# Patient Record
Sex: FEMALE | HISPANIC OR LATINO | URBAN - METROPOLITAN AREA
[De-identification: names, ages, dates, MRNs, and addresses within clinical notes are randomized per-mention and may not be internally consistent; named-entity substitution may affect disease eponyms.]

---

## 2024-09-27 ENCOUNTER — OFFICE VISIT (OUTPATIENT)
Dept: OBSTETRICS AND GYNECOLOGY | Facility: CLINIC | Age: 44
End: 2024-09-27
Payer: COMMERCIAL

## 2024-09-27 VITALS
WEIGHT: 161.81 LBS | DIASTOLIC BLOOD PRESSURE: 74 MMHG | HEIGHT: 67 IN | SYSTOLIC BLOOD PRESSURE: 122 MMHG | BODY MASS INDEX: 25.4 KG/M2

## 2024-09-27 DIAGNOSIS — D21.9 FIBROIDS: ICD-10-CM

## 2024-09-27 DIAGNOSIS — N93.8 DUB (DYSFUNCTIONAL UTERINE BLEEDING): Primary | ICD-10-CM

## 2024-09-27 PROCEDURE — 99999 PR PBB SHADOW E&M-NEW PATIENT-LVL III: CPT | Mod: PBBFAC,,, | Performed by: OBSTETRICS & GYNECOLOGY

## 2024-09-27 RX ORDER — NORETHINDRONE ACETATE AND ETHINYL ESTRADIOL 1MG-20(21)
1 KIT ORAL DAILY
Qty: 90 TABLET | Refills: 3 | Status: SHIPPED | OUTPATIENT
Start: 2024-09-27 | End: 2025-09-27

## 2024-09-27 RX ORDER — KETOCONAZOLE 20 MG/ML
SHAMPOO, SUSPENSION TOPICAL
COMMUNITY
Start: 2024-07-17

## 2024-09-27 RX ORDER — CLINDAMYCIN PHOSPHATE 11.9 MG/ML
SOLUTION TOPICAL
COMMUNITY
Start: 2024-01-15

## 2024-09-27 NOTE — PROGRESS NOTES
HISTORY OF PRESENT ILLNESS:    Jennie Davis is a 43 y.o. female, , No LMP recorded.,  presents for irregular bleeding   .   Last pap:  1 year   Contraception: Depo-Provera injections.    Sexually transmitted infection risk: very low risk of STD exposure.   Menstrual flow: irregular.  This is the extent of the patient's complaints at this time.     History reviewed. No pertinent past medical history.    Past Surgical History:   Procedure Laterality Date    PELVIC ARTERY EMBOLIZATION  2023       MEDICATIONS AND ALLERGIES:      Current Outpatient Medications:     clindamycin (CLEOCIN T) 1 % external solution, Apply topically., Disp: , Rfl:     ketoconazole (NIZORAL) 2 % shampoo, Apply topically every 7 days., Disp: , Rfl:     Review of patient's allergies indicates:   Allergen Reactions    Sandy Itching    Shellfish containing products        No family history on file.    Social History     Socioeconomic History    Marital status: Single   Tobacco Use    Smoking status: Never    Smokeless tobacco: Never   Substance and Sexual Activity    Alcohol use: Yes    Drug use: Yes     Types: Marijuana    Sexual activity: Not Currently     Social Determinants of Health     Food Insecurity: No Food Insecurity (3/29/2024)    Received from [facility]    Hunger Vital Sign     Worried About Running Out of Food in the Last Year: Never true     Ran Out of Food in the Last Year: Never true   Transportation Needs: No Transportation Needs (3/29/2024)    Received from [facility]    PRAPARE - Transportation     Lack of Transportation (Medical): No     Lack of Transportation (Non-Medical): No   Physical Activity: Unknown (2022)    Received from [facility], [facility]    Exercise Vital Sign     Days of Exercise per Week: 2 days       OB History    Para Term  AB Living   0 0 0 0 0 0   SAB IAB Ectopic Multiple Live Births   0 0 0 0 0          COMPREHENSIVE GYN HISTORY:  PAP History: Denies abnormal  "Paps.  Infection History: Denies STDs. Denies PID.  Benign History: Denies uterine fibroids. Denies ovarian cysts. Denies endometriosis. Denies other conditions.  Cancer History: Denies cervical cancer. Denies uterine cancer or hyperplasia. Denies ovarian cancer. Denies vulvar cancer or pre-cancer. Denies vaginal cancer or pre-cancer. Denies breast cancer. Denies colon cancer.      ROS:  GENERAL: No weight changes. No swelling. No fatigue. No fever.  BREASTS: No pain. No lumps. No discharge.  ABDOMEN: No pain. No nausea. No vomiting. No diarrhea. No constipation.  REPRODUCTIVE: No abnormal bleeding. No pelvic pain.   VULVA: No pain. No lesions. No itching.  VAGINA: No relaxation. No itching. No odor. No discharge. No lesions.  URINARY: No incontinence. No nocturia. No frequency. No dysuria.    /74   Ht 5' 7" (1.702 m)   Wt 73.4 kg (161 lb 13.1 oz)   BMI 25.34 kg/m²     PE:  Physical Exam   deferred  PROCEDURES/ORDERS:        Assessment/Plan:    DUB (dysfunctional uterine bleeding)    Fibroids              COUNSELING:  The patient was counseled today on:  -A.C.S. Pap and pelvic exam guidelines, recomendations for yearly mammogram, monthly self breast exams and to follow up with her PCP for other health maintenance.    FOLLOW-UP     Answers submitted by the patient for this visit:  Vaginal Bleeding Questionnaire  (Submitted on 9/20/2024)  Chief Complaint: Vaginal bleeding  Chronicity: chronic  Onset: more than 1 year ago  Frequency: constantly  Progression since onset: gradually worsening  Pain severity: mild  Affected side: both  Pregnant now?: No  abdominal pain: No  anorexia: No  back pain: Yes  chills: No  constipation: No  diarrhea: Yes  discolored urine: No  dysuria: No  fever: No  flank pain: No  frequency: No  headaches: No  hematuria: No  nausea: No  painful intercourse: No  rash: No  urgency: No  vomiting: No  Vaginal bleeding: heavier than menses  Passing clots?: Yes  Passing tissue?: " Yes  treatments tried: acetaminophen, heating pad, NSAIDs, warm bath  Improvement on treatment: mild  Sexual activity: not sexually active  Partner with STD symptoms: no  Birth control: condoms  Menstrual history: irregular  STD: Yes  abdominal surgery: No   section: No  Ectopic pregnancy: No  Endometriosis: No  herpes simplex: No  gynecological surgery: Yes  menorrhagia: Yes  metrorrhagia: No  miscarriage: Yes  ovarian cysts: Yes  perineal abscess: No  PID: No  terminated pregnancy: No  vaginosis: No

## 2024-10-29 ENCOUNTER — TELEPHONE (OUTPATIENT)
Dept: OBSTETRICS AND GYNECOLOGY | Facility: CLINIC | Age: 44
End: 2024-10-29
Payer: COMMERCIAL

## 2024-10-30 ENCOUNTER — PATIENT MESSAGE (OUTPATIENT)
Dept: OBSTETRICS AND GYNECOLOGY | Facility: CLINIC | Age: 44
End: 2024-10-30
Payer: COMMERCIAL

## 2025-01-14 ENCOUNTER — LAB VISIT (OUTPATIENT)
Dept: LAB | Facility: HOSPITAL | Age: 45
End: 2025-01-14
Attending: OBSTETRICS & GYNECOLOGY
Payer: COMMERCIAL

## 2025-01-14 ENCOUNTER — OFFICE VISIT (OUTPATIENT)
Dept: OBSTETRICS AND GYNECOLOGY | Facility: CLINIC | Age: 45
End: 2025-01-14
Payer: COMMERCIAL

## 2025-01-14 VITALS
DIASTOLIC BLOOD PRESSURE: 93 MMHG | HEIGHT: 67 IN | BODY MASS INDEX: 25.8 KG/M2 | HEART RATE: 72 BPM | SYSTOLIC BLOOD PRESSURE: 120 MMHG | WEIGHT: 164.38 LBS

## 2025-01-14 DIAGNOSIS — D21.9 FIBROIDS: ICD-10-CM

## 2025-01-14 DIAGNOSIS — N93.9 ABNORMAL UTERINE BLEEDING (AUB): Primary | ICD-10-CM

## 2025-01-14 LAB
BASOPHILS # BLD AUTO: 0.05 K/UL (ref 0–0.2)
BASOPHILS NFR BLD: 1.3 % (ref 0–1.9)
DIFFERENTIAL METHOD BLD: ABNORMAL
EOSINOPHIL # BLD AUTO: 0.1 K/UL (ref 0–0.5)
EOSINOPHIL NFR BLD: 1.3 % (ref 0–8)
ERYTHROCYTE [DISTWIDTH] IN BLOOD BY AUTOMATED COUNT: 16.8 % (ref 11.5–14.5)
FERRITIN SERPL-MCNC: 22 NG/ML (ref 20–300)
HCT VFR BLD AUTO: 41.5 % (ref 37–48.5)
HGB BLD-MCNC: 12.4 G/DL (ref 12–16)
IMM GRANULOCYTES # BLD AUTO: 0 K/UL (ref 0–0.04)
IMM GRANULOCYTES NFR BLD AUTO: 0 % (ref 0–0.5)
IRON SERPL-MCNC: 207 UG/DL (ref 30–160)
LYMPHOCYTES # BLD AUTO: 2.1 K/UL (ref 1–4.8)
LYMPHOCYTES NFR BLD: 53.6 % (ref 18–48)
MCH RBC QN AUTO: 26.7 PG (ref 27–31)
MCHC RBC AUTO-ENTMCNC: 29.9 G/DL (ref 32–36)
MCV RBC AUTO: 89 FL (ref 82–98)
MONOCYTES # BLD AUTO: 0.3 K/UL (ref 0.3–1)
MONOCYTES NFR BLD: 7.9 % (ref 4–15)
NEUTROPHILS # BLD AUTO: 1.4 K/UL (ref 1.8–7.7)
NEUTROPHILS NFR BLD: 35.9 % (ref 38–73)
NRBC BLD-RTO: 0 /100 WBC
PLATELET # BLD AUTO: 299 K/UL (ref 150–450)
PMV BLD AUTO: 11.2 FL (ref 9.2–12.9)
RBC # BLD AUTO: 4.64 M/UL (ref 4–5.4)
SATURATED IRON: 44 % (ref 20–50)
TOTAL IRON BINDING CAPACITY: 466 UG/DL (ref 250–450)
TRANSFERRIN SERPL-MCNC: 315 MG/DL (ref 200–375)
TSH SERPL DL<=0.005 MIU/L-ACNC: 1.83 UIU/ML (ref 0.4–4)
WBC # BLD AUTO: 3.9 K/UL (ref 3.9–12.7)

## 2025-01-14 PROCEDURE — 84466 ASSAY OF TRANSFERRIN: CPT | Performed by: OBSTETRICS & GYNECOLOGY

## 2025-01-14 PROCEDURE — 99999 PR PBB SHADOW E&M-EST. PATIENT-LVL III: CPT | Mod: PBBFAC,,, | Performed by: OBSTETRICS & GYNECOLOGY

## 2025-01-14 PROCEDURE — 85025 COMPLETE CBC W/AUTO DIFF WBC: CPT | Performed by: OBSTETRICS & GYNECOLOGY

## 2025-01-14 PROCEDURE — 84443 ASSAY THYROID STIM HORMONE: CPT | Performed by: OBSTETRICS & GYNECOLOGY

## 2025-01-14 PROCEDURE — 36415 COLL VENOUS BLD VENIPUNCTURE: CPT | Mod: PN | Performed by: OBSTETRICS & GYNECOLOGY

## 2025-01-14 PROCEDURE — 99214 OFFICE O/P EST MOD 30 MIN: CPT | Mod: S$GLB,,, | Performed by: OBSTETRICS & GYNECOLOGY

## 2025-01-14 PROCEDURE — 82728 ASSAY OF FERRITIN: CPT | Performed by: OBSTETRICS & GYNECOLOGY

## 2025-01-14 RX ORDER — DROSPIRENONE AND ETHINYL ESTRADIOL 0.03MG-3MG
1 KIT ORAL DAILY
Qty: 90 TABLET | Refills: 3 | Status: SHIPPED | OUTPATIENT
Start: 2025-01-14 | End: 2026-01-14

## 2025-01-14 NOTE — PROGRESS NOTES
Subjective     Patient ID: Jennie Davis is a 44 y.o. female.    Chief Complaint:  Follow-up      History of Present Illness  HPI    44 y.o. female  presents to [facility] (new to me). She has a history of AUB-L. She had a UAE in 3/2023. She has less cramping and bulk symptoms s/p UAE. She was previously on Depo Provera x 2023 for 3 doses. She had bleeding almost daily for a year while on Depo Provera. She was then transitioned to COCs about 3 months ago. She now has regular periods, spotting for a week then a full week of menses. Overall better but the 2 weeks of bleeding out of the month is bothersome. She is not yet interested in hysterectomy given she is unsure about fertility plans. She does not currently have a partner so would be going assisted reproductive route, expressed has even considered gestational carrier. She has not met with [facility].    MRI PELVIS W WO CONTRAST  Order: [id number]  Impression    1.   Patient is status post uterine artery embolization with interval decreased size of the uterus. Additionally, there is decreased size and enhancement of previously demonstrated extensive leiomyomata.  2.   Slightly smaller right simple ovarian cyst and left hemorrhagic ovarian cyst.    Preliminary Report Dictated By: EULALIA SARAH MD    Electronically Signed By: Aren Hou MD 9/10/2024 2:24 PM CDT  Narrative    CLINICAL HISTORY:  DIAGNOSIS:D21.9   Fibroids  REASON FOR STUDY:Fibroids, treatment planning  evaluate fibroids post embolization, persistent AUB    TECHNIQUE:  MRI of pelvis without and with IV contrast. Axial T2 weighted fat suppressed, diffusion, and axial T1-weighted in- and out-of-phase images were obtained. Sagittal single shot fast spin echo and 3D T1 weighted gradient echo images were acquired both prior to and at multiple time points following intravenous contrast. Subtraction images were performed.    CONTRAST: GADOBUTROL 10 MMOL/10 ML (1 MMOL/ML) INTRAVENOUS  SOLUTION:7mL    COMPARISON: MRI pelvis with and without contrast 2023.    FINDINGS:  UTERUS: The uterus is smaller on today's imaging, measuring 13.5 cm craniocaudally, 8.9 cm transversely in the mid body and 9.1 cm anteroposteriorly in the mid body. The craniocaudal length does not include length of the cervix as it is displaced laterally. This is in comparison to 15 x 11.6 x 9.5 cm on prior studies by my measurement.    The innumerable T1/T2 heterogenous intramural masses involving the myometrium noted on prior imaging are smaller on today's study. For instance, series 301, image 29 demonstrates a fibroid in the anterior uterine fundus which currently measures 4.1 x 5.8 x 4 cm compared to 4.8 x 7.2 x 4.8 cm previously. These display significantly decreased internal enhancement when compared to prior imaging.    The endometrium is obscured and the endometrial canal is irregular secondary to aforementioned extensive leiomyomata.    CERVIX: The cervix is normal in appearance with a few nabothian cysts present.    OVARIES:  The previously visualized right ovary measures approximately 4.1 x 1.8 x 3.2 cm with the dominant follicle/cyst measuring up to 2.3 cm.    The left ovary measures approximately 4.3 x 2.8 x 2.6 cm with the dominant follicle/cyst measuring up to 2.9 cm.    URINARY BLADDER: Normal in appearance.    REST OF THE PELVIC VISCERA: There is a similar amount of small volume ascites in the retrouterine pouch.    LYMPH NODES: No evidence of lymphadenopathy.    MUSCULOSKELETAL STRUCTURES: Fatty focus in the right iliac bone is unchanged.        No family history on file.        GYN & OB History  Patient's last menstrual period was 2025.   Date of Last Pap: No result found    OB History    Para Term  AB Living   0 0 0 0 0 0   SAB IAB Ectopic Multiple Live Births   0 0 0 0 0       Review of Systems  Review of Systems   Constitutional:  Negative for chills, diaphoresis, fatigue and fever.    Respiratory:  Negative for cough and shortness of breath.    Cardiovascular:  Negative for chest pain and palpitations.   Gastrointestinal:  Negative for abdominal pain, constipation, diarrhea, nausea and vomiting.   Genitourinary:  Positive for dysmenorrhea, menstrual problem, pelvic pain and vaginal bleeding. Negative for dyspareunia, dysuria, vaginal discharge and vaginal pain.   Neurological:  Negative for headaches.   Psychiatric/Behavioral:  Negative for depression. The patient is not nervous/anxious.           Objective   Physical Exam:   Constitutional: She is oriented to person, place, and time. She appears well-developed and well-nourished. No distress.    HENT:   Head: Normocephalic and atraumatic.    Eyes: EOM are normal.      Pulmonary/Chest: Effort normal.          Genitourinary:    Genitourinary Comments: Talk only             Musculoskeletal: Normal range of motion and moves all extremeties.       Neurological: She is alert and oriented to person, place, and time.     Psychiatric: She has a normal mood and affect. Her behavior is normal. Judgment and thought content normal.            Assessment and Plan     1. Abnormal uterine bleeding (AUB)    2. Fibroids       30      Plan:  Jennie was seen today for follow-up.    Diagnoses and all orders for this visit:    Abnormal uterine bleeding (AUB)  -     drospirenone-ethinyl estradioL (MIKE) 3-0.03 mg per tablet; Take 1 tablet by mouth once daily.    Fibroids  -     TSH; Future  -     CBC Auto Differential; Future  -     FERRITIN; Future  -     IRON AND TIBC; Future  -     drospirenone-ethinyl estradioL (MIKE) 3-0.03 mg per tablet; Take 1 tablet by mouth once daily.    Given continued breakthrough bleeding on low-dose CLYDE, will increase dose to see if that may help.  CBC and iron studies ordered.   Reviewed pelvic MRI as above. Discussed with patient surgical management for fibroids includes myomectomy or hysterectomy. She is interested in  myomectomy. Reviewed open vs robotic procedure and that given she may be a candidate for robotic myomectomy, I will refer to Dr. Roberts at [facility]. Discussed given her fertility interests, would also recommend a consult with [facility].  We did discuss further medical management with [facility], but limitations of length of use discussed.   We discussed referral for Accessa procedure but she is leaning toward myomectomy.    Orders Placed This Encounter   Procedures    TSH    CBC Auto Differential    FERRITIN    IRON AND TIBC     30 minutes of total time spent on the encounter, which includes face to face time and non-face to face time preparing to see the patient (eg, review of tests), Obtaining and/or reviewing separately obtained history, Documenting clinical information in the electronic or other health record, Independently interpreting results (not separately reported) and communicating results to the patient/family/caregiver, or Care coordination (not separately reported).      No follow-ups on file.

## 2025-07-11 ENCOUNTER — CLINICAL SUPPORT (OUTPATIENT)
Dept: OBSTETRICS AND GYNECOLOGY | Facility: CLINIC | Age: 45
End: 2025-07-11
Payer: COMMERCIAL

## 2025-07-11 DIAGNOSIS — N95.1 MENOPAUSAL SYMPTOMS: Primary | ICD-10-CM

## 2025-07-13 NOTE — PROGRESS NOTES
Personal Information    Full Name: Jennie Davis 1980        Medical History    Do you have a chronic medical condition? (e.g., diabetes, hypertension, elevated cholesterol, thyroid issues)   If yes, please specify:   No    2.   Do you have a history of hormone-related conditions or any type of cancer ? (e.g., endometriosis, breast cancer, or PCOS)?  If yes, please explain:  No    3.   Have you previously or are you currently taking hormone replacement therapy?   If yes, please list:   No    4.   Do you have a history of an autoimmune disorders?   If yes, please list:  No Paternal Family history of Sickle Cell     5.   Do you have any of these health conditions?      Neuro:   Numbness, tingling, or weakness in any part of your body     Cardiovascular disease:   N/A     Pulmonary:   N/A    GI:  N/A    Renal:  N/A     Heme:  N/A     Menstrual History    At what age did you begin menstruating?   13    2.   Have you experienced any changes in your menstrual cycle in the last year?   If yes, please describe:   Yes - Heavy periods & passing clots.     3.   When was your last menstrual period or age of last period?   7/2/2025    4.   Have you had a hysterectomy or ablation?   If yes, do you still have ovaries?  No     Symptoms Assesments      Are you experiencing any of the following symptoms:  Hot Flashes: Yes   Night Sweats: Yes   Vaginal Dryness or Pain/ Discomfort with [employer]: No   Mood Swings: Yes   Anxiety or Depression: Yes   Sleep Disturbances: Yes   Fatigue: No   Weight Gain: No   Joint or Muscle Pain: Yes   Memory Issues, Brain Fog or Loss of Focus: Yes   Decreased Interest in Sex (Low Libido): Yes     Lifestyle Factors    How would you describe your diet?  Healthy     2.   Do you exercise? If yes, how many days per week and for how many minutes?  Yes - 4 days a week   What types of exercise do you do?    Wt lifting, yoga and walking      3.   Do you smoke or use other nicotine products?    If yes,  please specify frequency:   Yes - Recreational Marijuana use     4.  Do you consume alcohol? If yes, please specify frequency:   Yes - Social drinker    If yes, what kind of alcohol (beer, wine, liquor)?  wine      5.   How would you rate your stress levels?   Moderate - High     6.   Do you take any supplements?   ? If yes, what supplements do you take (please include brand names)?  No    Family History    Is there a family history of menopause-related conditions? (e.g., osteoporosis, breast cancer)  If yes, please specify  No    2.   Is there a family history of heart disease?   If yes, please specify   Yes - Paternal Family History     3.   Has any family member had a heart attack?   If yes, who and at what age?   Yes - Paternal Gr Uncle     4.   Has any family member had a stroke?   If yes, who and what age?  Yes - Paternal Gr Uncle      5.   Has any family member had blood clots or a pulmonary embolism?  If yes, who and at what age?   No    Screening History    1.   Have you had a colonoscopy?  If yes, month or year:  No    2.   Have you had a mammogram?  If yes, month or year:  Yes - 8/2023    3.   Have you had an Annual/ Pap?   If yes, month or year:   No    4.   BMD (If patient is over 50)    N/A  ---------------------------------------------    Spoke with patient for a total of 30 minutes during virtual visit.  Patient was guided through expectations and treatment options.     Questions answered. Encouraged to send message or call office with any questions/concerns. Verbalized understanding.       Niyah